# Patient Record
Sex: MALE | Race: WHITE
[De-identification: names, ages, dates, MRNs, and addresses within clinical notes are randomized per-mention and may not be internally consistent; named-entity substitution may affect disease eponyms.]

---

## 2019-11-01 ENCOUNTER — HOSPITAL ENCOUNTER (EMERGENCY)
Dept: HOSPITAL 53 - M ED | Age: 44
Discharge: HOME | End: 2019-11-01
Payer: COMMERCIAL

## 2019-11-01 VITALS — SYSTOLIC BLOOD PRESSURE: 146 MMHG | DIASTOLIC BLOOD PRESSURE: 92 MMHG

## 2019-11-01 VITALS — WEIGHT: 240.5 LBS | HEIGHT: 70 IN | BODY MASS INDEX: 34.43 KG/M2

## 2019-11-01 DIAGNOSIS — S61.210A: Primary | ICD-10-CM
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
## 2019-11-01 NOTE — REP
Clinical:  Trauma.

 

Technique:  AP, lateral, bilateral oblique views of the right second digit.

 

Findings:

No acute fracture dislocation.  No foreign body.

 

Impression:

No fracture / dislocation or foreign body.

 

 

Electronically Signed by

Hiram Vega MD 11/01/2019 10:12 A